# Patient Record
Sex: FEMALE | Race: WHITE | NOT HISPANIC OR LATINO | Employment: OTHER | URBAN - METROPOLITAN AREA
[De-identification: names, ages, dates, MRNs, and addresses within clinical notes are randomized per-mention and may not be internally consistent; named-entity substitution may affect disease eponyms.]

---

## 2017-01-28 ENCOUNTER — APPOINTMENT (OUTPATIENT)
Dept: GENERAL RADIOLOGY | Facility: HOSPITAL | Age: 82
End: 2017-01-28

## 2017-01-28 ENCOUNTER — HOSPITAL ENCOUNTER (INPATIENT)
Facility: HOSPITAL | Age: 82
LOS: 7 days | Discharge: NURSING, REHAB OR LONG-TERM CARE | End: 2017-02-04
Attending: EMERGENCY MEDICINE | Admitting: INTERNAL MEDICINE

## 2017-01-28 DIAGNOSIS — R26.2 DIFFICULTY WALKING: ICD-10-CM

## 2017-01-28 DIAGNOSIS — S72.142A INTERTROCHANTERIC FRACTURE OF LEFT FEMUR, CLOSED, INITIAL ENCOUNTER (HCC): Primary | ICD-10-CM

## 2017-01-28 DIAGNOSIS — K63.5 COLON POLYP: ICD-10-CM

## 2017-01-28 LAB
ALBUMIN SERPL-MCNC: 4 G/DL (ref 3.5–5.2)
ALBUMIN/GLOB SERPL: 1.6 G/DL
ALP SERPL-CCNC: 70 U/L (ref 39–117)
ALT SERPL W P-5'-P-CCNC: 21 U/L (ref 1–33)
ANION GAP SERPL CALCULATED.3IONS-SCNC: 10.8 MMOL/L
AST SERPL-CCNC: 35 U/L (ref 1–32)
BASOPHILS # BLD AUTO: 0.02 10*3/MM3 (ref 0–0.2)
BASOPHILS NFR BLD AUTO: 0.1 % (ref 0–1.5)
BILIRUB SERPL-MCNC: 0.4 MG/DL (ref 0.1–1.2)
BUN BLD-MCNC: 30 MG/DL (ref 8–23)
BUN/CREAT SERPL: 29.7 (ref 7–25)
CALCIUM SPEC-SCNC: 9.7 MG/DL (ref 8.2–9.6)
CHLORIDE SERPL-SCNC: 96 MMOL/L (ref 98–107)
CO2 SERPL-SCNC: 26.2 MMOL/L (ref 22–29)
CREAT BLD-MCNC: 1.01 MG/DL (ref 0.57–1)
DEPRECATED RDW RBC AUTO: 44.4 FL (ref 37–54)
EOSINOPHIL # BLD AUTO: 0.22 10*3/MM3 (ref 0–0.7)
EOSINOPHIL NFR BLD AUTO: 1.5 % (ref 0.3–6.2)
ERYTHROCYTE [DISTWIDTH] IN BLOOD BY AUTOMATED COUNT: 12.7 % (ref 11.7–13)
GFR SERPL CREATININE-BSD FRML MDRD: 51 ML/MIN/1.73
GLOBULIN UR ELPH-MCNC: 2.5 GM/DL
GLUCOSE BLD-MCNC: 103 MG/DL (ref 65–99)
HCT VFR BLD AUTO: 37.7 % (ref 35.6–45.5)
HGB BLD-MCNC: 12.5 G/DL (ref 11.9–15.5)
IMM GRANULOCYTES # BLD: 0.06 10*3/MM3 (ref 0–0.03)
IMM GRANULOCYTES NFR BLD: 0.4 % (ref 0–0.5)
INR PPP: 0.98 (ref 0.9–1.1)
LYMPHOCYTES # BLD AUTO: 5.33 10*3/MM3 (ref 0.9–4.8)
LYMPHOCYTES NFR BLD AUTO: 36.3 % (ref 19.6–45.3)
MCH RBC QN AUTO: 31.8 PG (ref 26.9–32)
MCHC RBC AUTO-ENTMCNC: 33.2 G/DL (ref 32.4–36.3)
MCV RBC AUTO: 95.9 FL (ref 80.5–98.2)
MONOCYTES # BLD AUTO: 0.55 10*3/MM3 (ref 0.2–1.2)
MONOCYTES NFR BLD AUTO: 3.7 % (ref 5–12)
NEUTROPHILS # BLD AUTO: 8.52 10*3/MM3 (ref 1.9–8.1)
NEUTROPHILS NFR BLD AUTO: 58 % (ref 42.7–76)
PLATELET # BLD AUTO: 181 10*3/MM3 (ref 140–500)
PMV BLD AUTO: 10.3 FL (ref 6–12)
POTASSIUM BLD-SCNC: 4.6 MMOL/L (ref 3.5–5.2)
PROT SERPL-MCNC: 6.5 G/DL (ref 6–8.5)
PROTHROMBIN TIME: 12.6 SECONDS (ref 11.7–14.2)
RBC # BLD AUTO: 3.93 10*6/MM3 (ref 3.9–5.2)
SODIUM BLD-SCNC: 133 MMOL/L (ref 136–145)
WBC NRBC COR # BLD: 14.7 10*3/MM3 (ref 4.5–10.7)

## 2017-01-28 PROCEDURE — 93005 ELECTROCARDIOGRAM TRACING: CPT | Performed by: INTERNAL MEDICINE

## 2017-01-28 PROCEDURE — 93010 ELECTROCARDIOGRAM REPORT: CPT | Performed by: INTERNAL MEDICINE

## 2017-01-28 PROCEDURE — 25010000002 HYDROMORPHONE PER 4 MG: Performed by: EMERGENCY MEDICINE

## 2017-01-28 PROCEDURE — 85610 PROTHROMBIN TIME: CPT | Performed by: EMERGENCY MEDICINE

## 2017-01-28 PROCEDURE — 85025 COMPLETE CBC W/AUTO DIFF WBC: CPT | Performed by: EMERGENCY MEDICINE

## 2017-01-28 PROCEDURE — 25010000002 HYDROMORPHONE PER 4 MG

## 2017-01-28 PROCEDURE — 80053 COMPREHEN METABOLIC PANEL: CPT | Performed by: EMERGENCY MEDICINE

## 2017-01-28 PROCEDURE — 71010 HC CHEST PA OR AP: CPT

## 2017-01-28 PROCEDURE — 73502 X-RAY EXAM HIP UNI 2-3 VIEWS: CPT

## 2017-01-28 PROCEDURE — 25010000002 ONDANSETRON PER 1 MG: Performed by: EMERGENCY MEDICINE

## 2017-01-28 PROCEDURE — 25010000002 LORAZEPAM PER 2 MG: Performed by: INTERNAL MEDICINE

## 2017-01-28 PROCEDURE — 81001 URINALYSIS AUTO W/SCOPE: CPT | Performed by: INTERNAL MEDICINE

## 2017-01-28 PROCEDURE — 25010000002 HYDROMORPHONE PER 4 MG: Performed by: INTERNAL MEDICINE

## 2017-01-28 PROCEDURE — 99284 EMERGENCY DEPT VISIT MOD MDM: CPT

## 2017-01-28 PROCEDURE — 87086 URINE CULTURE/COLONY COUNT: CPT | Performed by: INTERNAL MEDICINE

## 2017-01-28 RX ORDER — CHOLECALCIFEROL (VITAMIN D3) 125 MCG
5 CAPSULE ORAL NIGHTLY
Status: DISCONTINUED | OUTPATIENT
Start: 2017-01-28 | End: 2017-02-01

## 2017-01-28 RX ORDER — ESTRADIOL 0.1 MG/G
2 CREAM VAGINAL 3 TIMES WEEKLY
Status: DISCONTINUED | OUTPATIENT
Start: 2017-01-30 | End: 2017-02-04 | Stop reason: HOSPADM

## 2017-01-28 RX ORDER — DEXTROSE, SODIUM CHLORIDE, AND POTASSIUM CHLORIDE 5; .45; .15 G/100ML; G/100ML; G/100ML
125 INJECTION INTRAVENOUS CONTINUOUS
Status: DISCONTINUED | OUTPATIENT
Start: 2017-01-28 | End: 2017-01-29

## 2017-01-28 RX ORDER — MORPHINE SULFATE 2 MG/ML
1 INJECTION, SOLUTION INTRAMUSCULAR; INTRAVENOUS EVERY 4 HOURS PRN
Status: DISCONTINUED | OUTPATIENT
Start: 2017-01-28 | End: 2017-02-04 | Stop reason: HOSPADM

## 2017-01-28 RX ORDER — NALOXONE HCL 0.4 MG/ML
0.4 VIAL (ML) INJECTION
Status: DISCONTINUED | OUTPATIENT
Start: 2017-01-28 | End: 2017-02-04 | Stop reason: HOSPADM

## 2017-01-28 RX ORDER — POLYETHYLENE GLYCOL 3350 17 G/17G
17 POWDER, FOR SOLUTION ORAL DAILY
Status: DISCONTINUED | OUTPATIENT
Start: 2017-01-29 | End: 2017-02-04 | Stop reason: HOSPADM

## 2017-01-28 RX ORDER — METOPROLOL TARTRATE 50 MG/1
50 TABLET, FILM COATED ORAL EVERY MORNING
Status: DISCONTINUED | OUTPATIENT
Start: 2017-01-29 | End: 2017-02-04 | Stop reason: HOSPADM

## 2017-01-28 RX ORDER — L.ACID,PARA/B.BIFIDUM/S.THERM 8B CELL
1 CAPSULE ORAL DAILY
Status: DISCONTINUED | OUTPATIENT
Start: 2017-01-29 | End: 2017-02-04 | Stop reason: HOSPADM

## 2017-01-28 RX ORDER — ONDANSETRON 2 MG/ML
4 INJECTION INTRAMUSCULAR; INTRAVENOUS ONCE
Status: COMPLETED | OUTPATIENT
Start: 2017-01-28 | End: 2017-01-28

## 2017-01-28 RX ORDER — SODIUM CHLORIDE 9 MG/ML
75 INJECTION, SOLUTION INTRAVENOUS CONTINUOUS
Status: DISCONTINUED | OUTPATIENT
Start: 2017-01-28 | End: 2017-01-28

## 2017-01-28 RX ORDER — HYDROMORPHONE HYDROCHLORIDE 1 MG/ML
0.5 INJECTION, SOLUTION INTRAMUSCULAR; INTRAVENOUS; SUBCUTANEOUS ONCE
Status: COMPLETED | OUTPATIENT
Start: 2017-01-28 | End: 2017-01-28

## 2017-01-28 RX ORDER — MELATONIN
1000 DAILY
Status: DISCONTINUED | OUTPATIENT
Start: 2017-01-29 | End: 2017-02-04 | Stop reason: HOSPADM

## 2017-01-28 RX ORDER — SODIUM CHLORIDE 0.9 % (FLUSH) 0.9 %
10 SYRINGE (ML) INJECTION AS NEEDED
Status: DISCONTINUED | OUTPATIENT
Start: 2017-01-28 | End: 2017-01-28

## 2017-01-28 RX ORDER — LORAZEPAM 2 MG/ML
0.5 INJECTION INTRAMUSCULAR EVERY 6 HOURS PRN
Status: DISCONTINUED | OUTPATIENT
Start: 2017-01-28 | End: 2017-01-31

## 2017-01-28 RX ORDER — RIVASTIGMINE 4.6 MG/24H
1 PATCH, EXTENDED RELEASE TRANSDERMAL DAILY
Status: DISCONTINUED | OUTPATIENT
Start: 2017-01-29 | End: 2017-02-04 | Stop reason: HOSPADM

## 2017-01-28 RX ORDER — SODIUM CHLORIDE 0.9 % (FLUSH) 0.9 %
1-10 SYRINGE (ML) INJECTION AS NEEDED
Status: DISCONTINUED | OUTPATIENT
Start: 2017-01-28 | End: 2017-02-04 | Stop reason: HOSPADM

## 2017-01-28 RX ORDER — PANTOPRAZOLE SODIUM 40 MG/10ML
40 INJECTION, POWDER, LYOPHILIZED, FOR SOLUTION INTRAVENOUS
Status: DISCONTINUED | OUTPATIENT
Start: 2017-01-29 | End: 2017-02-04 | Stop reason: HOSPADM

## 2017-01-28 RX ORDER — CETIRIZINE HYDROCHLORIDE 10 MG/1
5 TABLET ORAL DAILY
Status: DISCONTINUED | OUTPATIENT
Start: 2017-01-29 | End: 2017-02-01

## 2017-01-28 RX ADMIN — Medication 5 MG: at 23:52

## 2017-01-28 RX ADMIN — HYDROMORPHONE HYDROCHLORIDE 1 MG: 1 INJECTION, SOLUTION INTRAMUSCULAR; INTRAVENOUS; SUBCUTANEOUS at 21:09

## 2017-01-28 RX ADMIN — HYDROMORPHONE HYDROCHLORIDE 0.5 MG: 1 INJECTION, SOLUTION INTRAMUSCULAR; INTRAVENOUS; SUBCUTANEOUS at 18:07

## 2017-01-28 RX ADMIN — HYDROMORPHONE HYDROCHLORIDE 0.5 MG: 1 INJECTION, SOLUTION INTRAMUSCULAR; INTRAVENOUS; SUBCUTANEOUS at 18:32

## 2017-01-28 RX ADMIN — LORAZEPAM 0.5 MG: 2 INJECTION INTRAMUSCULAR; INTRAVENOUS at 22:09

## 2017-01-28 RX ADMIN — POTASSIUM CHLORIDE, DEXTROSE MONOHYDRATE AND SODIUM CHLORIDE 125 ML/HR: 150; 5; 450 INJECTION, SOLUTION INTRAVENOUS at 21:10

## 2017-01-28 RX ADMIN — ONDANSETRON 4 MG: 2 INJECTION INTRAMUSCULAR; INTRAVENOUS at 18:07

## 2017-01-29 ENCOUNTER — ANESTHESIA EVENT (OUTPATIENT)
Dept: PERIOP | Facility: HOSPITAL | Age: 82
End: 2017-01-29

## 2017-01-29 ENCOUNTER — APPOINTMENT (OUTPATIENT)
Dept: GENERAL RADIOLOGY | Facility: HOSPITAL | Age: 82
End: 2017-01-29

## 2017-01-29 ENCOUNTER — ANESTHESIA (OUTPATIENT)
Dept: PERIOP | Facility: HOSPITAL | Age: 82
End: 2017-01-29

## 2017-01-29 LAB
ANION GAP SERPL CALCULATED.3IONS-SCNC: 11.7 MMOL/L
BACTERIA UR QL AUTO: ABNORMAL /HPF
BASOPHILS # BLD AUTO: 0.01 10*3/MM3 (ref 0–0.2)
BASOPHILS NFR BLD AUTO: 0.1 % (ref 0–1.5)
BILIRUB UR QL STRIP: NEGATIVE
BUN BLD-MCNC: 27 MG/DL (ref 8–23)
BUN/CREAT SERPL: 26.2 (ref 7–25)
CALCIUM SPEC-SCNC: 8.9 MG/DL (ref 8.2–9.6)
CHLORIDE SERPL-SCNC: 97 MMOL/L (ref 98–107)
CLARITY UR: CLEAR
CO2 SERPL-SCNC: 25.3 MMOL/L (ref 22–29)
COLOR UR: YELLOW
CREAT BLD-MCNC: 1.03 MG/DL (ref 0.57–1)
DEPRECATED RDW RBC AUTO: 47.2 FL (ref 37–54)
EOSINOPHIL # BLD AUTO: 0 10*3/MM3 (ref 0–0.7)
EOSINOPHIL NFR BLD AUTO: 0 % (ref 0.3–6.2)
ERYTHROCYTE [DISTWIDTH] IN BLOOD BY AUTOMATED COUNT: 13.1 % (ref 11.7–13)
GFR SERPL CREATININE-BSD FRML MDRD: 50 ML/MIN/1.73
GLUCOSE BLD-MCNC: 181 MG/DL (ref 65–99)
GLUCOSE UR STRIP-MCNC: NEGATIVE MG/DL
HCT VFR BLD AUTO: 33.4 % (ref 35.6–45.5)
HGB BLD-MCNC: 10.8 G/DL (ref 11.9–15.5)
HGB UR QL STRIP.AUTO: ABNORMAL
HYALINE CASTS UR QL AUTO: ABNORMAL /LPF
IMM GRANULOCYTES # BLD: 0.02 10*3/MM3 (ref 0–0.03)
IMM GRANULOCYTES NFR BLD: 0.2 % (ref 0–0.5)
KETONES UR QL STRIP: NEGATIVE
LEUKOCYTE ESTERASE UR QL STRIP.AUTO: NEGATIVE
LYMPHOCYTES # BLD AUTO: 3.45 10*3/MM3 (ref 0.9–4.8)
LYMPHOCYTES NFR BLD AUTO: 31.2 % (ref 19.6–45.3)
MAGNESIUM SERPL-MCNC: 1.7 MG/DL (ref 1.7–2.3)
MCH RBC QN AUTO: 32 PG (ref 26.9–32)
MCHC RBC AUTO-ENTMCNC: 32.3 G/DL (ref 32.4–36.3)
MCV RBC AUTO: 98.8 FL (ref 80.5–98.2)
MONOCYTES # BLD AUTO: 0.56 10*3/MM3 (ref 0.2–1.2)
MONOCYTES NFR BLD AUTO: 5.1 % (ref 5–12)
NEUTROPHILS # BLD AUTO: 7.01 10*3/MM3 (ref 1.9–8.1)
NEUTROPHILS NFR BLD AUTO: 63.4 % (ref 42.7–76)
NITRITE UR QL STRIP: NEGATIVE
PH UR STRIP.AUTO: 6 [PH] (ref 5–8)
PLATELET # BLD AUTO: 162 10*3/MM3 (ref 140–500)
PMV BLD AUTO: 10.4 FL (ref 6–12)
POTASSIUM BLD-SCNC: 5.3 MMOL/L (ref 3.5–5.2)
PROT UR QL STRIP: NEGATIVE
RBC # BLD AUTO: 3.38 10*6/MM3 (ref 3.9–5.2)
RBC # UR: ABNORMAL /HPF
REF LAB TEST METHOD: ABNORMAL
SODIUM BLD-SCNC: 134 MMOL/L (ref 136–145)
SP GR UR STRIP: 1.02 (ref 1–1.03)
SQUAMOUS #/AREA URNS HPF: ABNORMAL /HPF
TSH SERPL DL<=0.05 MIU/L-ACNC: 1.18 MIU/ML (ref 0.27–4.2)
UROBILINOGEN UR QL STRIP: ABNORMAL
WBC NRBC COR # BLD: 11.05 10*3/MM3 (ref 4.5–10.7)
WBC UR QL AUTO: ABNORMAL /HPF

## 2017-01-29 PROCEDURE — 25010000002 HYDROMORPHONE PER 4 MG: Performed by: INTERNAL MEDICINE

## 2017-01-29 PROCEDURE — 84443 ASSAY THYROID STIM HORMONE: CPT | Performed by: INTERNAL MEDICINE

## 2017-01-29 PROCEDURE — 25010000003 CEFAZOLIN PER 500 MG: Performed by: ORTHOPAEDIC SURGERY

## 2017-01-29 PROCEDURE — 25010000002 FENTANYL CITRATE (PF) 100 MCG/2ML SOLUTION: Performed by: ANESTHESIOLOGY

## 2017-01-29 PROCEDURE — 25010000002 FENTANYL CITRATE (PF) 100 MCG/2ML SOLUTION

## 2017-01-29 PROCEDURE — 0QSC06Z REPOSITION LEFT LOWER FEMUR WITH INTRAMEDULLARY INTERNAL FIXATION DEVICE, OPEN APPROACH: ICD-10-PCS | Performed by: ORTHOPAEDIC SURGERY

## 2017-01-29 PROCEDURE — 25010000002 NEOSTIGMINE 10 MG/10ML SOLUTION: Performed by: ANESTHESIOLOGY

## 2017-01-29 PROCEDURE — 25010000002 ROPIVACAINE PER 1 MG

## 2017-01-29 PROCEDURE — 83735 ASSAY OF MAGNESIUM: CPT | Performed by: INTERNAL MEDICINE

## 2017-01-29 PROCEDURE — C1713 ANCHOR/SCREW BN/BN,TIS/BN: HCPCS | Performed by: ORTHOPAEDIC SURGERY

## 2017-01-29 PROCEDURE — 94799 UNLISTED PULMONARY SVC/PX: CPT

## 2017-01-29 PROCEDURE — 80048 BASIC METABOLIC PNL TOTAL CA: CPT | Performed by: INTERNAL MEDICINE

## 2017-01-29 PROCEDURE — 85025 COMPLETE CBC W/AUTO DIFF WBC: CPT | Performed by: INTERNAL MEDICINE

## 2017-01-29 PROCEDURE — 25010000002 MIDAZOLAM PER 1 MG: Performed by: ANESTHESIOLOGY

## 2017-01-29 PROCEDURE — 73502 X-RAY EXAM HIP UNI 2-3 VIEWS: CPT

## 2017-01-29 PROCEDURE — 76000 FLUOROSCOPY <1 HR PHYS/QHP: CPT

## 2017-01-29 PROCEDURE — 25010000002 HYDROMORPHONE PER 4 MG: Performed by: ANESTHESIOLOGY

## 2017-01-29 PROCEDURE — 73501 X-RAY EXAM HIP UNI 1 VIEW: CPT

## 2017-01-29 PROCEDURE — 25010000002 PROPOFOL 10 MG/ML EMULSION: Performed by: ANESTHESIOLOGY

## 2017-01-29 PROCEDURE — 25010000002 ONDANSETRON PER 1 MG: Performed by: ANESTHESIOLOGY

## 2017-01-29 PROCEDURE — 25010000002 PHENYLEPHRINE PER 1 ML: Performed by: ANESTHESIOLOGY

## 2017-01-29 DEVICE — TROCHANTERIC NAIL KIT, TI
Type: IMPLANTABLE DEVICE | Status: FUNCTIONAL
Brand: GAMMA

## 2017-01-29 DEVICE — LOCKING SCREW, FULLY THREADED: Type: IMPLANTABLE DEVICE | Status: FUNCTIONAL

## 2017-01-29 DEVICE — LAG SCREW, TI
Type: IMPLANTABLE DEVICE | Status: FUNCTIONAL
Brand: GAMMA

## 2017-01-29 RX ORDER — ONDANSETRON 2 MG/ML
INJECTION INTRAMUSCULAR; INTRAVENOUS AS NEEDED
Status: DISCONTINUED | OUTPATIENT
Start: 2017-01-29 | End: 2017-01-29 | Stop reason: SURG

## 2017-01-29 RX ORDER — PROMETHAZINE HYDROCHLORIDE 25 MG/ML
12.5 INJECTION, SOLUTION INTRAMUSCULAR; INTRAVENOUS ONCE AS NEEDED
Status: DISCONTINUED | OUTPATIENT
Start: 2017-01-29 | End: 2017-01-29 | Stop reason: HOSPADM

## 2017-01-29 RX ORDER — LIDOCAINE HYDROCHLORIDE 20 MG/ML
INJECTION, SOLUTION INFILTRATION; PERINEURAL AS NEEDED
Status: DISCONTINUED | OUTPATIENT
Start: 2017-01-29 | End: 2017-01-29 | Stop reason: SURG

## 2017-01-29 RX ORDER — ROPIVACAINE HYDROCHLORIDE 5 MG/ML
INJECTION, SOLUTION EPIDURAL; INFILTRATION; PERINEURAL
Status: COMPLETED
Start: 2017-01-29 | End: 2017-01-29

## 2017-01-29 RX ORDER — GLYCOPYRROLATE 0.2 MG/ML
INJECTION INTRAMUSCULAR; INTRAVENOUS AS NEEDED
Status: DISCONTINUED | OUTPATIENT
Start: 2017-01-29 | End: 2017-01-29 | Stop reason: SURG

## 2017-01-29 RX ORDER — ROCURONIUM BROMIDE 10 MG/ML
INJECTION, SOLUTION INTRAVENOUS AS NEEDED
Status: DISCONTINUED | OUTPATIENT
Start: 2017-01-29 | End: 2017-01-29 | Stop reason: SURG

## 2017-01-29 RX ORDER — SODIUM CHLORIDE 9 MG/ML
75 INJECTION, SOLUTION INTRAVENOUS CONTINUOUS
Status: DISCONTINUED | OUTPATIENT
Start: 2017-01-29 | End: 2017-01-31

## 2017-01-29 RX ORDER — FAMOTIDINE 10 MG/ML
20 INJECTION, SOLUTION INTRAVENOUS ONCE
Status: COMPLETED | OUTPATIENT
Start: 2017-01-29 | End: 2017-01-29

## 2017-01-29 RX ORDER — DOCUSATE SODIUM 100 MG/1
100 CAPSULE, LIQUID FILLED ORAL 2 TIMES DAILY PRN
Status: DISCONTINUED | OUTPATIENT
Start: 2017-01-29 | End: 2017-02-04 | Stop reason: HOSPADM

## 2017-01-29 RX ORDER — PROPOFOL 10 MG/ML
VIAL (ML) INTRAVENOUS AS NEEDED
Status: DISCONTINUED | OUTPATIENT
Start: 2017-01-29 | End: 2017-01-29 | Stop reason: SURG

## 2017-01-29 RX ORDER — FENTANYL CITRATE 50 UG/ML
50 INJECTION, SOLUTION INTRAMUSCULAR; INTRAVENOUS
Status: DISCONTINUED | OUTPATIENT
Start: 2017-01-29 | End: 2017-01-29 | Stop reason: HOSPADM

## 2017-01-29 RX ORDER — FENTANYL CITRATE 50 UG/ML
INJECTION, SOLUTION INTRAMUSCULAR; INTRAVENOUS
Status: COMPLETED
Start: 2017-01-29 | End: 2017-01-29

## 2017-01-29 RX ORDER — MIDAZOLAM HYDROCHLORIDE 1 MG/ML
2 INJECTION INTRAMUSCULAR; INTRAVENOUS
Status: DISCONTINUED | OUTPATIENT
Start: 2017-01-29 | End: 2017-01-29 | Stop reason: HOSPADM

## 2017-01-29 RX ORDER — SODIUM CHLORIDE, SODIUM LACTATE, POTASSIUM CHLORIDE, CALCIUM CHLORIDE 600; 310; 30; 20 MG/100ML; MG/100ML; MG/100ML; MG/100ML
9 INJECTION, SOLUTION INTRAVENOUS CONTINUOUS
Status: DISCONTINUED | OUTPATIENT
Start: 2017-01-29 | End: 2017-01-29

## 2017-01-29 RX ORDER — HYDROCODONE BITARTRATE AND ACETAMINOPHEN 7.5; 325 MG/1; MG/1
1 TABLET ORAL EVERY 4 HOURS PRN
Status: DISCONTINUED | OUTPATIENT
Start: 2017-01-29 | End: 2017-02-04 | Stop reason: HOSPADM

## 2017-01-29 RX ORDER — SODIUM CHLORIDE 0.9 % (FLUSH) 0.9 %
1-10 SYRINGE (ML) INJECTION AS NEEDED
Status: DISCONTINUED | OUTPATIENT
Start: 2017-01-29 | End: 2017-01-29 | Stop reason: HOSPADM

## 2017-01-29 RX ORDER — NEOSTIGMINE METHYLSULFATE 1 MG/ML
INJECTION, SOLUTION INTRAVENOUS AS NEEDED
Status: DISCONTINUED | OUTPATIENT
Start: 2017-01-29 | End: 2017-01-29 | Stop reason: SURG

## 2017-01-29 RX ORDER — PROMETHAZINE HYDROCHLORIDE 25 MG/1
25 SUPPOSITORY RECTAL ONCE AS NEEDED
Status: DISCONTINUED | OUTPATIENT
Start: 2017-01-29 | End: 2017-01-29 | Stop reason: HOSPADM

## 2017-01-29 RX ORDER — BISACODYL 5 MG/1
10 TABLET, DELAYED RELEASE ORAL DAILY PRN
Status: DISCONTINUED | OUTPATIENT
Start: 2017-01-29 | End: 2017-02-04 | Stop reason: HOSPADM

## 2017-01-29 RX ORDER — PROMETHAZINE HYDROCHLORIDE 25 MG/1
25 TABLET ORAL ONCE AS NEEDED
Status: DISCONTINUED | OUTPATIENT
Start: 2017-01-29 | End: 2017-01-29 | Stop reason: HOSPADM

## 2017-01-29 RX ORDER — HYDROMORPHONE HCL 110MG/55ML
PATIENT CONTROLLED ANALGESIA SYRINGE INTRAVENOUS
Status: DISCONTINUED
Start: 2017-01-29 | End: 2017-01-29 | Stop reason: WASHOUT

## 2017-01-29 RX ORDER — MIDAZOLAM HYDROCHLORIDE 1 MG/ML
1 INJECTION INTRAMUSCULAR; INTRAVENOUS
Status: DISCONTINUED | OUTPATIENT
Start: 2017-01-29 | End: 2017-01-29 | Stop reason: HOSPADM

## 2017-01-29 RX ORDER — HYDROMORPHONE HYDROCHLORIDE 1 MG/ML
0.5 INJECTION, SOLUTION INTRAMUSCULAR; INTRAVENOUS; SUBCUTANEOUS
Status: DISCONTINUED | OUTPATIENT
Start: 2017-01-29 | End: 2017-01-29 | Stop reason: HOSPADM

## 2017-01-29 RX ADMIN — SODIUM CHLORIDE, POTASSIUM CHLORIDE, SODIUM LACTATE AND CALCIUM CHLORIDE: 600; 310; 30; 20 INJECTION, SOLUTION INTRAVENOUS at 11:20

## 2017-01-29 RX ADMIN — GLYCOPYRROLATE 0.4 MG: 0.2 INJECTION INTRAMUSCULAR; INTRAVENOUS at 11:10

## 2017-01-29 RX ADMIN — CEFAZOLIN SODIUM 1 G: 1 INJECTION, SOLUTION INTRAVENOUS at 09:51

## 2017-01-29 RX ADMIN — HYDROCODONE BITARTRATE AND ACETAMINOPHEN 1 TABLET: 7.5; 325 TABLET ORAL at 21:49

## 2017-01-29 RX ADMIN — LIDOCAINE HYDROCHLORIDE 60 MG: 20 INJECTION, SOLUTION INFILTRATION; PERINEURAL at 10:04

## 2017-01-29 RX ADMIN — PANTOPRAZOLE SODIUM 40 MG: 40 INJECTION, POWDER, FOR SOLUTION INTRAVENOUS at 05:07

## 2017-01-29 RX ADMIN — FENTANYL CITRATE 50 MCG: 50 INJECTION, SOLUTION INTRAMUSCULAR; INTRAVENOUS at 11:20

## 2017-01-29 RX ADMIN — SODIUM CHLORIDE 75 ML/HR: 9 INJECTION, SOLUTION INTRAVENOUS at 22:32

## 2017-01-29 RX ADMIN — ONDANSETRON 4 MG: 2 INJECTION INTRAMUSCULAR; INTRAVENOUS at 11:24

## 2017-01-29 RX ADMIN — PROPOFOL 90 MG: 10 INJECTION, EMULSION INTRAVENOUS at 10:04

## 2017-01-29 RX ADMIN — PROPOFOL 30 MG: 10 INJECTION, EMULSION INTRAVENOUS at 10:06

## 2017-01-29 RX ADMIN — FENTANYL CITRATE 50 MCG: 50 INJECTION, SOLUTION INTRAMUSCULAR; INTRAVENOUS at 10:57

## 2017-01-29 RX ADMIN — HYDROCODONE BITARTRATE AND ACETAMINOPHEN 1 TABLET: 7.5; 325 TABLET ORAL at 16:11

## 2017-01-29 RX ADMIN — FENTANYL CITRATE 50 MCG: 50 INJECTION INTRAMUSCULAR; INTRAVENOUS at 11:59

## 2017-01-29 RX ADMIN — MIDAZOLAM 0.5 MG: 1 INJECTION INTRAMUSCULAR; INTRAVENOUS at 09:30

## 2017-01-29 RX ADMIN — METOPROLOL TARTRATE 50 MG: 50 TABLET ORAL at 08:30

## 2017-01-29 RX ADMIN — FAMOTIDINE 20 MG: 10 INJECTION, SOLUTION INTRAVENOUS at 09:30

## 2017-01-29 RX ADMIN — EPHEDRINE SULFATE 10 MG: 50 INJECTION INTRAMUSCULAR; INTRAVENOUS; SUBCUTANEOUS at 10:07

## 2017-01-29 RX ADMIN — Medication 5 MG: at 20:25

## 2017-01-29 RX ADMIN — EPHEDRINE SULFATE 10 MG: 50 INJECTION INTRAMUSCULAR; INTRAVENOUS; SUBCUTANEOUS at 10:12

## 2017-01-29 RX ADMIN — PHENYLEPHRINE HYDROCHLORIDE 200 MCG: 10 INJECTION INTRAVENOUS at 11:15

## 2017-01-29 RX ADMIN — HYDROMORPHONE HYDROCHLORIDE 1 MG: 1 INJECTION, SOLUTION INTRAMUSCULAR; INTRAVENOUS; SUBCUTANEOUS at 22:38

## 2017-01-29 RX ADMIN — ROPIVACAINE HYDROCHLORIDE 25 ML: 5 INJECTION, SOLUTION EPIDURAL; INFILTRATION; PERINEURAL at 10:25

## 2017-01-29 RX ADMIN — NEOSTIGMINE METHYLSULFATE 3 MG: 1 INJECTION INTRAVENOUS at 11:14

## 2017-01-29 RX ADMIN — FENTANYL CITRATE 100 MCG: 50 INJECTION, SOLUTION INTRAMUSCULAR; INTRAVENOUS at 10:02

## 2017-01-29 RX ADMIN — HYDROMORPHONE HYDROCHLORIDE 1 MG: 1 INJECTION, SOLUTION INTRAMUSCULAR; INTRAVENOUS; SUBCUTANEOUS at 02:22

## 2017-01-29 RX ADMIN — PHENYLEPHRINE HYDROCHLORIDE 100 MCG: 10 INJECTION INTRAVENOUS at 10:18

## 2017-01-29 RX ADMIN — CEFAZOLIN SODIUM 1 G: 1 INJECTION, SOLUTION INTRAVENOUS at 18:39

## 2017-01-29 RX ADMIN — POTASSIUM CHLORIDE, DEXTROSE MONOHYDRATE AND SODIUM CHLORIDE 125 ML/HR: 150; 5; 450 INJECTION, SOLUTION INTRAVENOUS at 04:52

## 2017-01-29 RX ADMIN — EPHEDRINE SULFATE 10 MG: 50 INJECTION INTRAMUSCULAR; INTRAVENOUS; SUBCUTANEOUS at 11:04

## 2017-01-29 RX ADMIN — SODIUM CHLORIDE, POTASSIUM CHLORIDE, SODIUM LACTATE AND CALCIUM CHLORIDE: 600; 310; 30; 20 INJECTION, SOLUTION INTRAVENOUS at 09:51

## 2017-01-29 RX ADMIN — PHENYLEPHRINE HYDROCHLORIDE 100 MCG: 10 INJECTION INTRAVENOUS at 10:26

## 2017-01-29 RX ADMIN — HYDROMORPHONE HYDROCHLORIDE 0.5 MG: 1 INJECTION, SOLUTION INTRAMUSCULAR; INTRAVENOUS; SUBCUTANEOUS at 12:05

## 2017-01-29 RX ADMIN — ROCURONIUM BROMIDE 30 MG: 10 INJECTION INTRAVENOUS at 10:04

## 2017-01-29 RX ADMIN — FENTANYL CITRATE 25 MCG: 50 INJECTION INTRAMUSCULAR; INTRAVENOUS at 09:31

## 2017-01-29 NOTE — ANESTHESIA PROCEDURE NOTES
Peripheral Block    Patient location during procedure: OR  Stop time: 1/29/2017 10:25 AM  Reason for block: at surgeon's request and post-op pain management  Performed by  Anesthesiologist: JASSI OLIVAS  Preanesthetic Checklist  Completed: patient identified, site marked, surgical consent, pre-op evaluation, timeout performed, IV checked, risks and benefits discussed and monitors and equipment checked  Peripheral Block Prep:  Sterile barriers:cap and mask  Prep: ChloraPrep  Patient monitoring: blood pressure monitoring, continuous pulse oximetry and EKG  Peripheral Procedure  Nursing cardiac assessment comments yes: under GA.  Guidance:landmark technique  Images:still images not obtained  Laterality:leftBlock Type:adductor canal block  Needle Type:Tuohy  Needle Gauge:18 G    Medications  Local anesthetic: ropiv 0.25% Local Amount Injected:45mL

## 2017-01-29 NOTE — ANESTHESIA PROCEDURE NOTES
Airway  Urgency: elective    Airway not difficult    General Information and Staff    Patient location during procedure: OR  Anesthesiologist: JASSI OLIVAS    Indications and Patient Condition  Indications for airway management: airway protection    Preoxygenated: yes  Mask difficulty assessment: 1 - vent by mask    Final Airway Details  Final airway type: endotracheal airway      Successful airway: ETT    Successful intubation technique: direct laryngoscopy  Facilitating devices/methods: cricoid pressure  Endotracheal tube insertion site: oral  Blade: [place]  Blade size: #3  ETT size: 7.0 mm  Cormack-Lehane Classification: grade IIa - partial view of glottis  Placement verified by: chest auscultation   Measured from: lips  Number of attempts at approach: 1

## 2017-01-29 NOTE — ANESTHESIA POSTPROCEDURE EVALUATION
Patient: Emy Jameson    Procedure Summary     Date Anesthesia Start Anesthesia Stop Room / Location    01/29/17 0951 1137  [facility] /  [facility]       Procedure Diagnosis Surgeon Provider    FEMUR INTRAMEDULLARY NAILING/RODDING GAMMA NAIL PAULINA GUADALUPEA TABLE NABILA (Left Thigh) Closed comminuted intertrochanteric fracture of left femur MD Rosario Rooney MD          Anesthesia Type: general  Last vitals  /54 (01/29/17 1220)    Temp 36.4 °C (97.6 °F) (01/29/17 1135)    Pulse 92 (01/29/17 1220)   Resp 14 (01/29/17 1220)    SpO2 100 % (01/29/17 1220)      Post Anesthesia Care and Evaluation    Patient location during evaluation: PACU  Patient participation: complete - patient participated  Level of consciousness: sleepy but conscious  Pain score: 0  Pain management: adequate  Airway patency: patent  Anesthetic complications: No anesthetic complications    Cardiovascular status: acceptable  Respiratory status: acceptable  Hydration status: acceptable

## 2017-01-29 NOTE — ANESTHESIA PREPROCEDURE EVALUATION
Anesthesia Evaluation     Patient summary reviewed and Nursing notes reviewed    Airway   Mallampati: II  Neck ROM: full  possible difficult intubation  Dental - normal exam     Pulmonary     breath sounds clear to auscultation  Cardiovascular   (+) hypertension,     Rhythm: regular    Neuro/Psych  (+) psychiatric history Depression,    GI/Hepatic/Renal/Endo    (+)  chronic renal disease,     Musculoskeletal     Abdominal    Substance History      OB/GYN          Other   (+) blood dyscrasia, arthritis                          Anesthesia Plan    ASA 3     general     intravenous induction   Anesthetic plan and risks discussed with patient.

## 2017-01-30 LAB
ANION GAP SERPL CALCULATED.3IONS-SCNC: 13.2 MMOL/L
BACTERIA SPEC AEROBE CULT: NO GROWTH
BASOPHILS # BLD AUTO: 0.01 10*3/MM3 (ref 0–0.2)
BASOPHILS NFR BLD AUTO: 0.1 % (ref 0–1.5)
BUN BLD-MCNC: 26 MG/DL (ref 8–23)
BUN/CREAT SERPL: 15 (ref 7–25)
CALCIUM SPEC-SCNC: 8.6 MG/DL (ref 8.2–9.6)
CHLORIDE SERPL-SCNC: 94 MMOL/L (ref 98–107)
CO2 SERPL-SCNC: 22.8 MMOL/L (ref 22–29)
CREAT BLD-MCNC: 1.73 MG/DL (ref 0.57–1)
DEPRECATED RDW RBC AUTO: 45.6 FL (ref 37–54)
EOSINOPHIL # BLD AUTO: 0.02 10*3/MM3 (ref 0–0.7)
EOSINOPHIL NFR BLD AUTO: 0.2 % (ref 0.3–6.2)
ERYTHROCYTE [DISTWIDTH] IN BLOOD BY AUTOMATED COUNT: 13 % (ref 11.7–13)
GFR SERPL CREATININE-BSD FRML MDRD: 28 ML/MIN/1.73
GLUCOSE BLD-MCNC: 122 MG/DL (ref 65–99)
HCT VFR BLD AUTO: 26.7 % (ref 35.6–45.5)
HGB BLD-MCNC: 8.7 G/DL (ref 11.9–15.5)
IMM GRANULOCYTES # BLD: 0.04 10*3/MM3 (ref 0–0.03)
IMM GRANULOCYTES NFR BLD: 0.4 % (ref 0–0.5)
LYMPHOCYTES # BLD AUTO: 3.23 10*3/MM3 (ref 0.9–4.8)
LYMPHOCYTES NFR BLD AUTO: 28.5 % (ref 19.6–45.3)
MCH RBC QN AUTO: 31.5 PG (ref 26.9–32)
MCHC RBC AUTO-ENTMCNC: 32.6 G/DL (ref 32.4–36.3)
MCV RBC AUTO: 96.7 FL (ref 80.5–98.2)
MONOCYTES # BLD AUTO: 0.94 10*3/MM3 (ref 0.2–1.2)
MONOCYTES NFR BLD AUTO: 8.3 % (ref 5–12)
NEUTROPHILS # BLD AUTO: 7.1 10*3/MM3 (ref 1.9–8.1)
NEUTROPHILS NFR BLD AUTO: 62.5 % (ref 42.7–76)
PLATELET # BLD AUTO: 143 10*3/MM3 (ref 140–500)
PMV BLD AUTO: 10.4 FL (ref 6–12)
POTASSIUM BLD-SCNC: 5.3 MMOL/L (ref 3.5–5.2)
RBC # BLD AUTO: 2.76 10*6/MM3 (ref 3.9–5.2)
SODIUM BLD-SCNC: 130 MMOL/L (ref 136–145)
WBC NRBC COR # BLD: 11.34 10*3/MM3 (ref 4.5–10.7)

## 2017-01-30 PROCEDURE — 25010000002 ENOXAPARIN PER 10 MG: Performed by: ORTHOPAEDIC SURGERY

## 2017-01-30 PROCEDURE — 85025 COMPLETE CBC W/AUTO DIFF WBC: CPT | Performed by: INTERNAL MEDICINE

## 2017-01-30 PROCEDURE — 80048 BASIC METABOLIC PNL TOTAL CA: CPT | Performed by: INTERNAL MEDICINE

## 2017-01-30 PROCEDURE — 25010000002 HYDROMORPHONE PER 4 MG: Performed by: INTERNAL MEDICINE

## 2017-01-30 PROCEDURE — 97162 PT EVAL MOD COMPLEX 30 MIN: CPT

## 2017-01-30 PROCEDURE — 25010000003 CEFAZOLIN PER 500 MG: Performed by: ORTHOPAEDIC SURGERY

## 2017-01-30 PROCEDURE — 97110 THERAPEUTIC EXERCISES: CPT

## 2017-01-30 PROCEDURE — 25010000002 MORPHINE PER 10 MG: Performed by: INTERNAL MEDICINE

## 2017-01-30 PROCEDURE — 94799 UNLISTED PULMONARY SVC/PX: CPT

## 2017-01-30 RX ORDER — DEXTROSE AND SODIUM CHLORIDE 5; .45 G/100ML; G/100ML
125 INJECTION, SOLUTION INTRAVENOUS CONTINUOUS
Status: DISCONTINUED | OUTPATIENT
Start: 2017-01-30 | End: 2017-01-31

## 2017-01-30 RX ADMIN — HYDROCODONE BITARTRATE AND ACETAMINOPHEN 1 TABLET: 7.5; 325 TABLET ORAL at 20:16

## 2017-01-30 RX ADMIN — DEXTROSE AND SODIUM CHLORIDE 100 ML/HR: 5; 450 INJECTION, SOLUTION INTRAVENOUS at 20:16

## 2017-01-30 RX ADMIN — HYDROMORPHONE HYDROCHLORIDE 1 MG: 1 INJECTION, SOLUTION INTRAMUSCULAR; INTRAVENOUS; SUBCUTANEOUS at 15:28

## 2017-01-30 RX ADMIN — MORPHINE SULFATE 1 MG: 2 INJECTION, SOLUTION INTRAMUSCULAR; INTRAVENOUS at 13:12

## 2017-01-30 RX ADMIN — Medication 5 MG: at 20:16

## 2017-01-30 RX ADMIN — RIVASTIGMINE TRANSDERMAL SYSTEM 1 PATCH: 4.6 PATCH, EXTENDED RELEASE TRANSDERMAL at 09:49

## 2017-01-30 RX ADMIN — VITAMIN D, TAB 1000IU (100/BT) 1000 UNITS: 25 TAB at 09:49

## 2017-01-30 RX ADMIN — PANTOPRAZOLE SODIUM 40 MG: 40 INJECTION, POWDER, FOR SOLUTION INTRAVENOUS at 06:38

## 2017-01-30 RX ADMIN — HYDROCODONE BITARTRATE AND ACETAMINOPHEN 1 TABLET: 7.5; 325 TABLET ORAL at 09:48

## 2017-01-30 RX ADMIN — POLYETHYLENE GLYCOL 3350 17 G: 17 POWDER, FOR SOLUTION ORAL at 09:48

## 2017-01-30 RX ADMIN — ENOXAPARIN SODIUM 30 MG: 30 INJECTION SUBCUTANEOUS at 09:48

## 2017-01-30 RX ADMIN — HYDROMORPHONE HYDROCHLORIDE 1 MG: 1 INJECTION, SOLUTION INTRAMUSCULAR; INTRAVENOUS; SUBCUTANEOUS at 04:01

## 2017-01-30 RX ADMIN — DEXTROSE AND SODIUM CHLORIDE 100 ML/HR: 5; 450 INJECTION, SOLUTION INTRAVENOUS at 09:50

## 2017-01-30 RX ADMIN — CALCIUM CARBONATE-VITAMIN D TAB 500 MG-200 UNIT 500 MG: 500-200 TAB at 09:49

## 2017-01-30 RX ADMIN — CETIRIZINE HYDROCHLORIDE 5 MG: 10 TABLET, FILM COATED ORAL at 09:49

## 2017-01-30 RX ADMIN — HYDROMORPHONE HYDROCHLORIDE 1 MG: 1 INJECTION, SOLUTION INTRAMUSCULAR; INTRAVENOUS; SUBCUTANEOUS at 00:38

## 2017-01-30 RX ADMIN — CEFAZOLIN SODIUM 1 G: 1 INJECTION, SOLUTION INTRAVENOUS at 00:33

## 2017-01-30 RX ADMIN — Medication 1 CAPSULE: at 09:49

## 2017-01-31 ENCOUNTER — APPOINTMENT (OUTPATIENT)
Dept: GENERAL RADIOLOGY | Facility: HOSPITAL | Age: 82
End: 2017-01-31
Attending: INTERNAL MEDICINE

## 2017-01-31 LAB
ANION GAP SERPL CALCULATED.3IONS-SCNC: 16.5 MMOL/L
BUN BLD-MCNC: 32 MG/DL (ref 8–23)
BUN/CREAT SERPL: 20.6 (ref 7–25)
CALCIUM SPEC-SCNC: 8.6 MG/DL (ref 8.2–9.6)
CHLORIDE SERPL-SCNC: 84 MMOL/L (ref 98–107)
CO2 SERPL-SCNC: 19.5 MMOL/L (ref 22–29)
CREAT BLD-MCNC: 1.55 MG/DL (ref 0.57–1)
GFR SERPL CREATININE-BSD FRML MDRD: 31 ML/MIN/1.73
GLUCOSE BLD-MCNC: 140 MG/DL (ref 65–99)
OSMOLALITY UR: 224 MOSM/KG (ref 250–1200)
POTASSIUM BLD-SCNC: 5.2 MMOL/L (ref 3.5–5.2)
SODIUM BLD-SCNC: 120 MMOL/L (ref 136–145)
SODIUM UR-SCNC: <20 MMOL/L

## 2017-01-31 PROCEDURE — 71101 X-RAY EXAM UNILAT RIBS/CHEST: CPT

## 2017-01-31 PROCEDURE — 83935 ASSAY OF URINE OSMOLALITY: CPT | Performed by: INTERNAL MEDICINE

## 2017-01-31 PROCEDURE — 25010000002 ENOXAPARIN PER 10 MG: Performed by: ORTHOPAEDIC SURGERY

## 2017-01-31 PROCEDURE — 25010000002 FUROSEMIDE PER 20 MG: Performed by: INTERNAL MEDICINE

## 2017-01-31 PROCEDURE — 25010000002 MORPHINE PER 10 MG: Performed by: INTERNAL MEDICINE

## 2017-01-31 PROCEDURE — 94799 UNLISTED PULMONARY SVC/PX: CPT

## 2017-01-31 PROCEDURE — 83930 ASSAY OF BLOOD OSMOLALITY: CPT | Performed by: INTERNAL MEDICINE

## 2017-01-31 PROCEDURE — 84300 ASSAY OF URINE SODIUM: CPT | Performed by: INTERNAL MEDICINE

## 2017-01-31 PROCEDURE — 25010000002 HYDROMORPHONE PER 4 MG: Performed by: INTERNAL MEDICINE

## 2017-01-31 PROCEDURE — 80048 BASIC METABOLIC PNL TOTAL CA: CPT | Performed by: INTERNAL MEDICINE

## 2017-01-31 RX ORDER — SODIUM CHLORIDE 9 MG/ML
75 INJECTION, SOLUTION INTRAVENOUS CONTINUOUS
Status: DISCONTINUED | OUTPATIENT
Start: 2017-01-31 | End: 2017-02-02

## 2017-01-31 RX ORDER — FERROUS SULFATE 325(65) MG
325 TABLET ORAL
Status: DISCONTINUED | OUTPATIENT
Start: 2017-01-31 | End: 2017-02-04 | Stop reason: HOSPADM

## 2017-01-31 RX ORDER — FUROSEMIDE 10 MG/ML
40 INJECTION INTRAMUSCULAR; INTRAVENOUS ONCE
Status: COMPLETED | OUTPATIENT
Start: 2017-01-31 | End: 2017-01-31

## 2017-01-31 RX ORDER — LORAZEPAM 0.5 MG/1
0.5 TABLET ORAL EVERY 6 HOURS PRN
Status: DISCONTINUED | OUTPATIENT
Start: 2017-01-31 | End: 2017-02-04 | Stop reason: HOSPADM

## 2017-01-31 RX ADMIN — HYDROMORPHONE HYDROCHLORIDE 1 MG: 1 INJECTION, SOLUTION INTRAMUSCULAR; INTRAVENOUS; SUBCUTANEOUS at 06:14

## 2017-01-31 RX ADMIN — DOCUSATE SODIUM 100 MG: 100 CAPSULE, LIQUID FILLED ORAL at 10:15

## 2017-01-31 RX ADMIN — CALCIUM CARBONATE-VITAMIN D TAB 500 MG-200 UNIT 500 MG: 500-200 TAB at 09:56

## 2017-01-31 RX ADMIN — Medication 1 CAPSULE: at 09:56

## 2017-01-31 RX ADMIN — RIVASTIGMINE TRANSDERMAL SYSTEM 1 PATCH: 4.6 PATCH, EXTENDED RELEASE TRANSDERMAL at 09:55

## 2017-01-31 RX ADMIN — VITAMIN D, TAB 1000IU (100/BT) 1000 UNITS: 25 TAB at 09:56

## 2017-01-31 RX ADMIN — ENOXAPARIN SODIUM 30 MG: 30 INJECTION SUBCUTANEOUS at 09:57

## 2017-01-31 RX ADMIN — POLYETHYLENE GLYCOL 3350 17 G: 17 POWDER, FOR SOLUTION ORAL at 09:57

## 2017-01-31 RX ADMIN — LORAZEPAM 0.5 MG: 0.5 TABLET ORAL at 10:12

## 2017-01-31 RX ADMIN — FUROSEMIDE 40 MG: 10 INJECTION, SOLUTION INTRAMUSCULAR; INTRAVENOUS at 22:27

## 2017-01-31 RX ADMIN — DEXTROSE AND SODIUM CHLORIDE 100 ML/HR: 5; 450 INJECTION, SOLUTION INTRAVENOUS at 05:54

## 2017-01-31 RX ADMIN — CETIRIZINE HYDROCHLORIDE 5 MG: 10 TABLET, FILM COATED ORAL at 09:56

## 2017-01-31 RX ADMIN — HYDROCODONE BITARTRATE AND ACETAMINOPHEN 1 TABLET: 7.5; 325 TABLET ORAL at 03:40

## 2017-01-31 RX ADMIN — HYDROMORPHONE HYDROCHLORIDE 1 MG: 1 INJECTION, SOLUTION INTRAMUSCULAR; INTRAVENOUS; SUBCUTANEOUS at 12:53

## 2017-01-31 RX ADMIN — DEXTROSE AND SODIUM CHLORIDE 125 ML/HR: 5; 450 INJECTION, SOLUTION INTRAVENOUS at 14:42

## 2017-01-31 RX ADMIN — SODIUM CHLORIDE 125 ML/HR: 9 INJECTION, SOLUTION INTRAVENOUS at 22:27

## 2017-01-31 RX ADMIN — Medication 5 MG: at 22:05

## 2017-01-31 RX ADMIN — IRON 325 MG: 65 TABLET ORAL at 12:45

## 2017-01-31 RX ADMIN — MORPHINE SULFATE 1 MG: 2 INJECTION, SOLUTION INTRAMUSCULAR; INTRAVENOUS at 04:33

## 2017-01-31 RX ADMIN — PANTOPRAZOLE SODIUM 40 MG: 40 INJECTION, POWDER, FOR SOLUTION INTRAVENOUS at 05:30

## 2017-02-01 LAB
ABO GROUP BLD: NORMAL
ANION GAP SERPL CALCULATED.3IONS-SCNC: 13.3 MMOL/L
BASOPHILS # BLD AUTO: 0 10*3/MM3 (ref 0–0.2)
BASOPHILS NFR BLD AUTO: 0 % (ref 0–1.5)
BLD GP AB SCN SERPL QL: NEGATIVE
BUN BLD-MCNC: 33 MG/DL (ref 8–23)
BUN/CREAT SERPL: 25.8 (ref 7–25)
CALCIUM SPEC-SCNC: 8.2 MG/DL (ref 8.2–9.6)
CHLORIDE SERPL-SCNC: 87 MMOL/L (ref 98–107)
CHLORIDE UR-SCNC: 26 MMOL/L
CHOLEST SERPL-MCNC: 114 MG/DL (ref 0–200)
CO2 SERPL-SCNC: 21.7 MMOL/L (ref 22–29)
CORTIS SERPL-MCNC: 16.66 MCG/DL
CREAT BLD-MCNC: 1.28 MG/DL (ref 0.57–1)
DEPRECATED RDW RBC AUTO: 42.3 FL (ref 37–54)
EOSINOPHIL # BLD AUTO: 0.03 10*3/MM3 (ref 0–0.7)
EOSINOPHIL NFR BLD AUTO: 0.3 % (ref 0.3–6.2)
ERYTHROCYTE [DISTWIDTH] IN BLOOD BY AUTOMATED COUNT: 12.5 % (ref 11.7–13)
GFR SERPL CREATININE-BSD FRML MDRD: 39 ML/MIN/1.73
GLUCOSE BLD-MCNC: 118 MG/DL (ref 65–99)
HCT VFR BLD AUTO: 22.6 % (ref 35.6–45.5)
HDLC SERPL-MCNC: 52 MG/DL (ref 40–60)
HGB BLD-MCNC: 7.4 G/DL (ref 11.9–15.5)
IMM GRANULOCYTES # BLD: 0.02 10*3/MM3 (ref 0–0.03)
IMM GRANULOCYTES NFR BLD: 0.2 % (ref 0–0.5)
LDLC SERPL CALC-MCNC: 48 MG/DL (ref 0–100)
LDLC/HDLC SERPL: 0.92 {RATIO}
LYMPHOCYTES # BLD AUTO: 3.89 10*3/MM3 (ref 0.9–4.8)
LYMPHOCYTES NFR BLD AUTO: 36 % (ref 19.6–45.3)
MCH RBC QN AUTO: 31.1 PG (ref 26.9–32)
MCHC RBC AUTO-ENTMCNC: 32.7 G/DL (ref 32.4–36.3)
MCV RBC AUTO: 95 FL (ref 80.5–98.2)
MONOCYTES # BLD AUTO: 0.67 10*3/MM3 (ref 0.2–1.2)
MONOCYTES NFR BLD AUTO: 6.2 % (ref 5–12)
NEUTROPHILS # BLD AUTO: 6.2 10*3/MM3 (ref 1.9–8.1)
NEUTROPHILS NFR BLD AUTO: 57.3 % (ref 42.7–76)
NT-PROBNP SERPL-MCNC: 9111 PG/ML (ref 0–1800)
OSMOLALITY SERPL: 256 MOSM/KG (ref 285–295)
OSMOLALITY UR: 160 MOSM/KG (ref 250–1200)
PLATELET # BLD AUTO: 164 10*3/MM3 (ref 140–500)
PMV BLD AUTO: 10.7 FL (ref 6–12)
POTASSIUM BLD-SCNC: 4.9 MMOL/L (ref 3.5–5.2)
RBC # BLD AUTO: 2.38 10*6/MM3 (ref 3.9–5.2)
RH BLD: POSITIVE
SODIUM BLD-SCNC: 122 MMOL/L (ref 136–145)
SODIUM UR-SCNC: 27 MMOL/L
TRIGL SERPL-MCNC: 72 MG/DL (ref 0–150)
TSH SERPL DL<=0.05 MIU/L-ACNC: 0.9 MIU/ML (ref 0.27–4.2)
VLDLC SERPL-MCNC: 14.4 MG/DL (ref 5–40)
WBC NRBC COR # BLD: 10.81 10*3/MM3 (ref 4.5–10.7)

## 2017-02-01 PROCEDURE — 86900 BLOOD TYPING SEROLOGIC ABO: CPT

## 2017-02-01 PROCEDURE — 82436 ASSAY OF URINE CHLORIDE: CPT | Performed by: INTERNAL MEDICINE

## 2017-02-01 PROCEDURE — 86901 BLOOD TYPING SEROLOGIC RH(D): CPT

## 2017-02-01 PROCEDURE — 97110 THERAPEUTIC EXERCISES: CPT

## 2017-02-01 PROCEDURE — 80061 LIPID PANEL: CPT | Performed by: INTERNAL MEDICINE

## 2017-02-01 PROCEDURE — 86923 COMPATIBILITY TEST ELECTRIC: CPT

## 2017-02-01 PROCEDURE — 84300 ASSAY OF URINE SODIUM: CPT | Performed by: INTERNAL MEDICINE

## 2017-02-01 PROCEDURE — 84443 ASSAY THYROID STIM HORMONE: CPT | Performed by: INTERNAL MEDICINE

## 2017-02-01 PROCEDURE — 83880 ASSAY OF NATRIURETIC PEPTIDE: CPT | Performed by: INTERNAL MEDICINE

## 2017-02-01 PROCEDURE — 86850 RBC ANTIBODY SCREEN: CPT

## 2017-02-01 PROCEDURE — 25010000002 ENOXAPARIN PER 10 MG: Performed by: ORTHOPAEDIC SURGERY

## 2017-02-01 PROCEDURE — 85025 COMPLETE CBC W/AUTO DIFF WBC: CPT | Performed by: INTERNAL MEDICINE

## 2017-02-01 PROCEDURE — 83935 ASSAY OF URINE OSMOLALITY: CPT | Performed by: INTERNAL MEDICINE

## 2017-02-01 PROCEDURE — 80048 BASIC METABOLIC PNL TOTAL CA: CPT | Performed by: INTERNAL MEDICINE

## 2017-02-01 PROCEDURE — 36430 TRANSFUSION BLD/BLD COMPNT: CPT

## 2017-02-01 PROCEDURE — 82533 TOTAL CORTISOL: CPT | Performed by: INTERNAL MEDICINE

## 2017-02-01 PROCEDURE — P9016 RBC LEUKOCYTES REDUCED: HCPCS

## 2017-02-01 RX ADMIN — POLYETHYLENE GLYCOL 3350 17 G: 17 POWDER, FOR SOLUTION ORAL at 10:09

## 2017-02-01 RX ADMIN — SODIUM CHLORIDE 125 ML/HR: 9 INJECTION, SOLUTION INTRAVENOUS at 06:16

## 2017-02-01 RX ADMIN — ENOXAPARIN SODIUM 30 MG: 30 INJECTION SUBCUTANEOUS at 10:10

## 2017-02-01 RX ADMIN — HYDROCODONE BITARTRATE AND ACETAMINOPHEN 1 TABLET: 7.5; 325 TABLET ORAL at 05:36

## 2017-02-01 RX ADMIN — VITAMIN D, TAB 1000IU (100/BT) 1000 UNITS: 25 TAB at 10:10

## 2017-02-01 RX ADMIN — HYDROCODONE BITARTRATE AND ACETAMINOPHEN 1 TABLET: 7.5; 325 TABLET ORAL at 22:08

## 2017-02-01 RX ADMIN — Medication 1 CAPSULE: at 10:10

## 2017-02-01 RX ADMIN — IRON 325 MG: 65 TABLET ORAL at 10:10

## 2017-02-01 RX ADMIN — METOPROLOL TARTRATE 50 MG: 50 TABLET ORAL at 10:10

## 2017-02-01 RX ADMIN — RIVASTIGMINE TRANSDERMAL SYSTEM 1 PATCH: 4.6 PATCH, EXTENDED RELEASE TRANSDERMAL at 10:12

## 2017-02-01 RX ADMIN — PANTOPRAZOLE SODIUM 40 MG: 40 INJECTION, POWDER, FOR SOLUTION INTRAVENOUS at 05:36

## 2017-02-01 RX ADMIN — CALCIUM CARBONATE-VITAMIN D TAB 500 MG-200 UNIT 500 MG: 500-200 TAB at 10:10

## 2017-02-01 RX ADMIN — LORAZEPAM 0.5 MG: 0.5 TABLET ORAL at 03:20

## 2017-02-02 ENCOUNTER — APPOINTMENT (OUTPATIENT)
Dept: GENERAL RADIOLOGY | Facility: HOSPITAL | Age: 82
End: 2017-02-02
Attending: INTERNAL MEDICINE

## 2017-02-02 LAB
ABO + RH BLD: NORMAL
ANION GAP SERPL CALCULATED.3IONS-SCNC: 13.8 MMOL/L
BASOPHILS # BLD AUTO: 0.01 10*3/MM3 (ref 0–0.2)
BASOPHILS NFR BLD AUTO: 0.1 % (ref 0–1.5)
BH BB BLOOD EXPIRATION DATE: NORMAL
BH BB BLOOD TYPE BARCODE: 7300
BH BB DISPENSE STATUS: NORMAL
BH BB PRODUCT CODE: NORMAL
BH BB UNIT NUMBER: NORMAL
BUN BLD-MCNC: 36 MG/DL (ref 8–23)
BUN/CREAT SERPL: 34.3 (ref 7–25)
CALCIUM SPEC-SCNC: 8.5 MG/DL (ref 8.2–9.6)
CHLORIDE SERPL-SCNC: 93 MMOL/L (ref 98–107)
CO2 SERPL-SCNC: 22.2 MMOL/L (ref 22–29)
CREAT BLD-MCNC: 1.05 MG/DL (ref 0.57–1)
DEPRECATED RDW RBC AUTO: 48.1 FL (ref 37–54)
EOSINOPHIL # BLD AUTO: 0.18 10*3/MM3 (ref 0–0.7)
EOSINOPHIL NFR BLD AUTO: 2.1 % (ref 0.3–6.2)
ERYTHROCYTE [DISTWIDTH] IN BLOOD BY AUTOMATED COUNT: 14.4 % (ref 11.7–13)
GFR SERPL CREATININE-BSD FRML MDRD: 49 ML/MIN/1.73
GLUCOSE BLD-MCNC: 107 MG/DL (ref 65–99)
HCT VFR BLD AUTO: 25.1 % (ref 35.6–45.5)
HGB BLD-MCNC: 8.6 G/DL (ref 11.9–15.5)
IMM GRANULOCYTES # BLD: 0.02 10*3/MM3 (ref 0–0.03)
IMM GRANULOCYTES NFR BLD: 0.2 % (ref 0–0.5)
LYMPHOCYTES # BLD AUTO: 4.18 10*3/MM3 (ref 0.9–4.8)
LYMPHOCYTES NFR BLD AUTO: 47.7 % (ref 19.6–45.3)
MCH RBC QN AUTO: 31.4 PG (ref 26.9–32)
MCHC RBC AUTO-ENTMCNC: 34.3 G/DL (ref 32.4–36.3)
MCV RBC AUTO: 91.6 FL (ref 80.5–98.2)
MONOCYTES # BLD AUTO: 0.54 10*3/MM3 (ref 0.2–1.2)
MONOCYTES NFR BLD AUTO: 6.2 % (ref 5–12)
NEUTROPHILS # BLD AUTO: 3.84 10*3/MM3 (ref 1.9–8.1)
NEUTROPHILS NFR BLD AUTO: 43.7 % (ref 42.7–76)
NT-PROBNP SERPL-MCNC: 6330 PG/ML (ref 0–1800)
PLATELET # BLD AUTO: 165 10*3/MM3 (ref 140–500)
PMV BLD AUTO: 9.9 FL (ref 6–12)
POTASSIUM BLD-SCNC: 4.8 MMOL/L (ref 3.5–5.2)
RBC # BLD AUTO: 2.74 10*6/MM3 (ref 3.9–5.2)
SODIUM BLD-SCNC: 129 MMOL/L (ref 136–145)
TROPONIN T SERPL-MCNC: 0.04 NG/ML (ref 0–0.03)
UNIT  ABO: NORMAL
UNIT  RH: NORMAL
WBC NRBC COR # BLD: 8.77 10*3/MM3 (ref 4.5–10.7)

## 2017-02-02 PROCEDURE — 84484 ASSAY OF TROPONIN QUANT: CPT | Performed by: INTERNAL MEDICINE

## 2017-02-02 PROCEDURE — 25010000002 FUROSEMIDE PER 20 MG: Performed by: INTERNAL MEDICINE

## 2017-02-02 PROCEDURE — 97110 THERAPEUTIC EXERCISES: CPT

## 2017-02-02 PROCEDURE — 80048 BASIC METABOLIC PNL TOTAL CA: CPT | Performed by: INTERNAL MEDICINE

## 2017-02-02 PROCEDURE — 85025 COMPLETE CBC W/AUTO DIFF WBC: CPT | Performed by: INTERNAL MEDICINE

## 2017-02-02 PROCEDURE — 93010 ELECTROCARDIOGRAM REPORT: CPT | Performed by: INTERNAL MEDICINE

## 2017-02-02 PROCEDURE — 83880 ASSAY OF NATRIURETIC PEPTIDE: CPT | Performed by: INTERNAL MEDICINE

## 2017-02-02 PROCEDURE — 94640 AIRWAY INHALATION TREATMENT: CPT

## 2017-02-02 PROCEDURE — 25010000002 ENOXAPARIN PER 10 MG: Performed by: ORTHOPAEDIC SURGERY

## 2017-02-02 PROCEDURE — 71010 HC CHEST PA OR AP: CPT

## 2017-02-02 PROCEDURE — 93005 ELECTROCARDIOGRAM TRACING: CPT | Performed by: INTERNAL MEDICINE

## 2017-02-02 PROCEDURE — 94799 UNLISTED PULMONARY SVC/PX: CPT

## 2017-02-02 RX ORDER — ALBUTEROL SULFATE 0.63 MG/3ML
0.63 SOLUTION RESPIRATORY (INHALATION) EVERY 6 HOURS PRN
Status: DISCONTINUED | OUTPATIENT
Start: 2017-02-02 | End: 2017-02-02 | Stop reason: CLARIF

## 2017-02-02 RX ORDER — FUROSEMIDE 10 MG/ML
40 INJECTION INTRAMUSCULAR; INTRAVENOUS ONCE
Status: COMPLETED | OUTPATIENT
Start: 2017-02-02 | End: 2017-02-02

## 2017-02-02 RX ORDER — ALBUTEROL SULFATE 2.5 MG/3ML
2.5 SOLUTION RESPIRATORY (INHALATION) EVERY 6 HOURS PRN
Status: DISCONTINUED | OUTPATIENT
Start: 2017-02-02 | End: 2017-02-04 | Stop reason: HOSPADM

## 2017-02-02 RX ADMIN — FUROSEMIDE 40 MG: 10 INJECTION, SOLUTION INTRAMUSCULAR; INTRAVENOUS at 20:50

## 2017-02-02 RX ADMIN — HYDROCODONE BITARTRATE AND ACETAMINOPHEN 1 TABLET: 7.5; 325 TABLET ORAL at 20:50

## 2017-02-02 RX ADMIN — IRON 325 MG: 65 TABLET ORAL at 09:48

## 2017-02-02 RX ADMIN — Medication 1 CAPSULE: at 09:48

## 2017-02-02 RX ADMIN — CALCIUM CARBONATE-VITAMIN D TAB 500 MG-200 UNIT 500 MG: 500-200 TAB at 17:55

## 2017-02-02 RX ADMIN — ENOXAPARIN SODIUM 30 MG: 30 INJECTION SUBCUTANEOUS at 09:47

## 2017-02-02 RX ADMIN — IRON 325 MG: 65 TABLET ORAL at 17:55

## 2017-02-02 RX ADMIN — PANTOPRAZOLE SODIUM 40 MG: 40 INJECTION, POWDER, FOR SOLUTION INTRAVENOUS at 05:47

## 2017-02-02 RX ADMIN — RIVASTIGMINE TRANSDERMAL SYSTEM 1 PATCH: 4.6 PATCH, EXTENDED RELEASE TRANSDERMAL at 09:47

## 2017-02-02 RX ADMIN — HYDROCODONE BITARTRATE AND ACETAMINOPHEN 1 TABLET: 7.5; 325 TABLET ORAL at 05:47

## 2017-02-02 RX ADMIN — VITAMIN D, TAB 1000IU (100/BT) 1000 UNITS: 25 TAB at 09:48

## 2017-02-02 RX ADMIN — IRON 325 MG: 65 TABLET ORAL at 12:29

## 2017-02-02 RX ADMIN — ALBUTEROL SULFATE 2.5 MG: 2.5 SOLUTION RESPIRATORY (INHALATION) at 21:42

## 2017-02-02 RX ADMIN — SODIUM CHLORIDE 125 ML/HR: 9 INJECTION, SOLUTION INTRAVENOUS at 08:04

## 2017-02-02 RX ADMIN — SODIUM CHLORIDE 75 ML/HR: 9 INJECTION, SOLUTION INTRAVENOUS at 17:03

## 2017-02-02 RX ADMIN — BISACODYL 10 MG: 5 TABLET, COATED ORAL at 17:54

## 2017-02-02 RX ADMIN — CALCIUM CARBONATE-VITAMIN D TAB 500 MG-200 UNIT 500 MG: 500-200 TAB at 09:48

## 2017-02-02 RX ADMIN — METOPROLOL TARTRATE 50 MG: 50 TABLET ORAL at 09:48

## 2017-02-02 RX ADMIN — HYDROCODONE BITARTRATE AND ACETAMINOPHEN 1 TABLET: 7.5; 325 TABLET ORAL at 02:03

## 2017-02-02 RX ADMIN — HYDROCODONE BITARTRATE AND ACETAMINOPHEN 1 TABLET: 7.5; 325 TABLET ORAL at 14:52

## 2017-02-03 LAB
ANION GAP SERPL CALCULATED.3IONS-SCNC: 13.7 MMOL/L
BASOPHILS # BLD AUTO: 0.02 10*3/MM3 (ref 0–0.2)
BASOPHILS NFR BLD AUTO: 0.2 % (ref 0–1.5)
BUN BLD-MCNC: 35 MG/DL (ref 8–23)
BUN/CREAT SERPL: 34.3 (ref 7–25)
CALCIUM SPEC-SCNC: 8.7 MG/DL (ref 8.2–9.6)
CHLORIDE SERPL-SCNC: 94 MMOL/L (ref 98–107)
CO2 SERPL-SCNC: 23.3 MMOL/L (ref 22–29)
CREAT BLD-MCNC: 1.02 MG/DL (ref 0.57–1)
DEPRECATED RDW RBC AUTO: 49.4 FL (ref 37–54)
EOSINOPHIL # BLD AUTO: 0.17 10*3/MM3 (ref 0–0.7)
EOSINOPHIL NFR BLD AUTO: 1.7 % (ref 0.3–6.2)
ERYTHROCYTE [DISTWIDTH] IN BLOOD BY AUTOMATED COUNT: 14.4 % (ref 11.7–13)
GFR SERPL CREATININE-BSD FRML MDRD: 51 ML/MIN/1.73
GLUCOSE BLD-MCNC: 118 MG/DL (ref 65–99)
HCT VFR BLD AUTO: 26.3 % (ref 35.6–45.5)
HGB BLD-MCNC: 8.5 G/DL (ref 11.9–15.5)
IMM GRANULOCYTES # BLD: 0.03 10*3/MM3 (ref 0–0.03)
IMM GRANULOCYTES NFR BLD: 0.3 % (ref 0–0.5)
LYMPHOCYTES # BLD AUTO: 4.23 10*3/MM3 (ref 0.9–4.8)
LYMPHOCYTES NFR BLD AUTO: 42.1 % (ref 19.6–45.3)
MCH RBC QN AUTO: 30.5 PG (ref 26.9–32)
MCHC RBC AUTO-ENTMCNC: 32.3 G/DL (ref 32.4–36.3)
MCV RBC AUTO: 94.3 FL (ref 80.5–98.2)
MONOCYTES # BLD AUTO: 0.61 10*3/MM3 (ref 0.2–1.2)
MONOCYTES NFR BLD AUTO: 6.1 % (ref 5–12)
NEUTROPHILS # BLD AUTO: 4.99 10*3/MM3 (ref 1.9–8.1)
NEUTROPHILS NFR BLD AUTO: 49.6 % (ref 42.7–76)
NRBC BLD MANUAL-RTO: 0 /100 WBC (ref 0–0)
PLATELET # BLD AUTO: 202 10*3/MM3 (ref 140–500)
PMV BLD AUTO: 10.1 FL (ref 6–12)
POTASSIUM BLD-SCNC: 4.4 MMOL/L (ref 3.5–5.2)
RBC # BLD AUTO: 2.79 10*6/MM3 (ref 3.9–5.2)
SODIUM BLD-SCNC: 131 MMOL/L (ref 136–145)
WBC NRBC COR # BLD: 10.05 10*3/MM3 (ref 4.5–10.7)

## 2017-02-03 PROCEDURE — 25010000002 HYDROMORPHONE PER 4 MG: Performed by: INTERNAL MEDICINE

## 2017-02-03 PROCEDURE — 97110 THERAPEUTIC EXERCISES: CPT

## 2017-02-03 PROCEDURE — 80048 BASIC METABOLIC PNL TOTAL CA: CPT | Performed by: INTERNAL MEDICINE

## 2017-02-03 PROCEDURE — 25010000002 ENOXAPARIN PER 10 MG: Performed by: ORTHOPAEDIC SURGERY

## 2017-02-03 PROCEDURE — 85025 COMPLETE CBC W/AUTO DIFF WBC: CPT | Performed by: INTERNAL MEDICINE

## 2017-02-03 RX ORDER — HALOPERIDOL 5 MG/ML
1 INJECTION INTRAMUSCULAR EVERY 6 HOURS PRN
Status: DISCONTINUED | OUTPATIENT
Start: 2017-02-03 | End: 2017-02-04 | Stop reason: HOSPADM

## 2017-02-03 RX ADMIN — PANTOPRAZOLE SODIUM 40 MG: 40 INJECTION, POWDER, FOR SOLUTION INTRAVENOUS at 10:02

## 2017-02-03 RX ADMIN — ENOXAPARIN SODIUM 30 MG: 30 INJECTION SUBCUTANEOUS at 10:02

## 2017-02-03 RX ADMIN — HYDROCODONE BITARTRATE AND ACETAMINOPHEN 1 TABLET: 7.5; 325 TABLET ORAL at 00:54

## 2017-02-03 RX ADMIN — METOPROLOL TARTRATE 50 MG: 50 TABLET ORAL at 09:56

## 2017-02-03 RX ADMIN — RIVASTIGMINE TRANSDERMAL SYSTEM 1 PATCH: 4.6 PATCH, EXTENDED RELEASE TRANSDERMAL at 09:56

## 2017-02-03 RX ADMIN — HYDROCODONE BITARTRATE AND ACETAMINOPHEN 1 TABLET: 7.5; 325 TABLET ORAL at 12:24

## 2017-02-03 RX ADMIN — HYDROCODONE BITARTRATE AND ACETAMINOPHEN 1 TABLET: 7.5; 325 TABLET ORAL at 18:49

## 2017-02-03 RX ADMIN — CALCIUM CARBONATE-VITAMIN D TAB 500 MG-200 UNIT 500 MG: 500-200 TAB at 18:49

## 2017-02-03 RX ADMIN — IRON 325 MG: 65 TABLET ORAL at 09:55

## 2017-02-03 RX ADMIN — ESTRADIOL 2 G: 0.1 CREAM VAGINAL at 09:57

## 2017-02-03 RX ADMIN — POLYETHYLENE GLYCOL 3350 17 G: 17 POWDER, FOR SOLUTION ORAL at 10:01

## 2017-02-03 RX ADMIN — CALCIUM CARBONATE-VITAMIN D TAB 500 MG-200 UNIT 500 MG: 500-200 TAB at 09:55

## 2017-02-03 RX ADMIN — HYDROCODONE BITARTRATE AND ACETAMINOPHEN 1 TABLET: 7.5; 325 TABLET ORAL at 07:05

## 2017-02-03 RX ADMIN — HYDROMORPHONE HYDROCHLORIDE 1 MG: 1 INJECTION, SOLUTION INTRAMUSCULAR; INTRAVENOUS; SUBCUTANEOUS at 14:10

## 2017-02-03 RX ADMIN — IRON 325 MG: 65 TABLET ORAL at 18:49

## 2017-02-03 RX ADMIN — LORAZEPAM 0.5 MG: 0.5 TABLET ORAL at 19:10

## 2017-02-03 RX ADMIN — Medication 1 CAPSULE: at 09:56

## 2017-02-03 RX ADMIN — IRON 325 MG: 65 TABLET ORAL at 11:48

## 2017-02-03 RX ADMIN — VITAMIN D, TAB 1000IU (100/BT) 1000 UNITS: 25 TAB at 09:56

## 2017-02-04 VITALS
DIASTOLIC BLOOD PRESSURE: 59 MMHG | HEIGHT: 63 IN | WEIGHT: 137.44 LBS | OXYGEN SATURATION: 96 % | BODY MASS INDEX: 24.35 KG/M2 | TEMPERATURE: 98.4 F | RESPIRATION RATE: 16 BRPM | SYSTOLIC BLOOD PRESSURE: 105 MMHG | HEART RATE: 99 BPM

## 2017-02-04 PROBLEM — S72.142A INTERTROCHANTERIC FRACTURE OF LEFT FEMUR (HCC): Status: RESOLVED | Noted: 2017-01-28 | Resolved: 2017-02-04

## 2017-02-04 LAB
ANION GAP SERPL CALCULATED.3IONS-SCNC: 10.7 MMOL/L
BASOPHILS # BLD AUTO: 0.02 10*3/MM3 (ref 0–0.2)
BASOPHILS NFR BLD AUTO: 0.2 % (ref 0–1.5)
BUN BLD-MCNC: 33 MG/DL (ref 8–23)
BUN/CREAT SERPL: 37.1 (ref 7–25)
CALCIUM SPEC-SCNC: 9.1 MG/DL (ref 8.2–9.6)
CHLORIDE SERPL-SCNC: 94 MMOL/L (ref 98–107)
CO2 SERPL-SCNC: 27.3 MMOL/L (ref 22–29)
CREAT BLD-MCNC: 0.89 MG/DL (ref 0.57–1)
DEPRECATED RDW RBC AUTO: 48.3 FL (ref 37–54)
EOSINOPHIL # BLD AUTO: 0.22 10*3/MM3 (ref 0–0.7)
EOSINOPHIL NFR BLD AUTO: 1.9 % (ref 0.3–6.2)
ERYTHROCYTE [DISTWIDTH] IN BLOOD BY AUTOMATED COUNT: 14.1 % (ref 11.7–13)
GFR SERPL CREATININE-BSD FRML MDRD: 59 ML/MIN/1.73
GLUCOSE BLD-MCNC: 112 MG/DL (ref 65–99)
HCT VFR BLD AUTO: 26.1 % (ref 35.6–45.5)
HGB BLD-MCNC: 8.6 G/DL (ref 11.9–15.5)
IMM GRANULOCYTES # BLD: 0.04 10*3/MM3 (ref 0–0.03)
IMM GRANULOCYTES NFR BLD: 0.4 % (ref 0–0.5)
LYMPHOCYTES # BLD AUTO: 5.21 10*3/MM3 (ref 0.9–4.8)
LYMPHOCYTES NFR BLD AUTO: 45.7 % (ref 19.6–45.3)
MCH RBC QN AUTO: 31 PG (ref 26.9–32)
MCHC RBC AUTO-ENTMCNC: 33 G/DL (ref 32.4–36.3)
MCV RBC AUTO: 94.2 FL (ref 80.5–98.2)
MONOCYTES # BLD AUTO: 0.65 10*3/MM3 (ref 0.2–1.2)
MONOCYTES NFR BLD AUTO: 5.7 % (ref 5–12)
NEUTROPHILS # BLD AUTO: 5.25 10*3/MM3 (ref 1.9–8.1)
NEUTROPHILS NFR BLD AUTO: 46.1 % (ref 42.7–76)
PLATELET # BLD AUTO: 232 10*3/MM3 (ref 140–500)
PMV BLD AUTO: 9.6 FL (ref 6–12)
POTASSIUM BLD-SCNC: 4.5 MMOL/L (ref 3.5–5.2)
RBC # BLD AUTO: 2.77 10*6/MM3 (ref 3.9–5.2)
SODIUM BLD-SCNC: 132 MMOL/L (ref 136–145)
WBC NRBC COR # BLD: 11.39 10*3/MM3 (ref 4.5–10.7)

## 2017-02-04 PROCEDURE — 97110 THERAPEUTIC EXERCISES: CPT

## 2017-02-04 PROCEDURE — 80048 BASIC METABOLIC PNL TOTAL CA: CPT | Performed by: INTERNAL MEDICINE

## 2017-02-04 PROCEDURE — 94799 UNLISTED PULMONARY SVC/PX: CPT

## 2017-02-04 PROCEDURE — 25010000002 ENOXAPARIN PER 10 MG: Performed by: ORTHOPAEDIC SURGERY

## 2017-02-04 PROCEDURE — 85025 COMPLETE CBC W/AUTO DIFF WBC: CPT | Performed by: INTERNAL MEDICINE

## 2017-02-04 RX ORDER — HYDROCODONE BITARTRATE AND ACETAMINOPHEN 7.5; 325 MG/1; MG/1
1 TABLET ORAL EVERY 6 HOURS PRN
Qty: 50 TABLET | Refills: 0 | Status: SHIPPED | OUTPATIENT
Start: 2017-02-04 | End: 2017-02-12

## 2017-02-04 RX ORDER — FERROUS SULFATE 325(65) MG
325 TABLET ORAL
Qty: 120 TABLET | Refills: 3 | Status: SHIPPED | OUTPATIENT
Start: 2017-02-04

## 2017-02-04 RX ORDER — ASPIRIN 81 MG/1
81 TABLET ORAL DAILY
Qty: 60 TABLET | Refills: 3 | Status: SHIPPED | OUTPATIENT
Start: 2017-02-04

## 2017-02-04 RX ORDER — LORAZEPAM 0.5 MG/1
0.5 TABLET ORAL EVERY 8 HOURS PRN
Qty: 50 TABLET | Refills: 0 | Status: SHIPPED | OUTPATIENT
Start: 2017-02-04 | End: 2017-02-10

## 2017-02-04 RX ORDER — PSEUDOEPHEDRINE HCL 30 MG
100 TABLET ORAL 2 TIMES DAILY PRN
Qty: 60 CAPSULE | Refills: 3 | Status: SHIPPED | OUTPATIENT
Start: 2017-02-04

## 2017-02-04 RX ORDER — BISACODYL 5 MG/1
10 TABLET, DELAYED RELEASE ORAL DAILY PRN
Qty: 40 TABLET | Refills: 0 | Status: SHIPPED | OUTPATIENT
Start: 2017-02-04

## 2017-02-04 RX ADMIN — CALCIUM CARBONATE-VITAMIN D TAB 500 MG-200 UNIT 500 MG: 500-200 TAB at 08:43

## 2017-02-04 RX ADMIN — VITAMIN D, TAB 1000IU (100/BT) 1000 UNITS: 25 TAB at 08:43

## 2017-02-04 RX ADMIN — IRON 325 MG: 65 TABLET ORAL at 13:36

## 2017-02-04 RX ADMIN — IRON 325 MG: 65 TABLET ORAL at 08:43

## 2017-02-04 RX ADMIN — HYDROCODONE BITARTRATE AND ACETAMINOPHEN 1 TABLET: 7.5; 325 TABLET ORAL at 13:36

## 2017-02-04 RX ADMIN — ENOXAPARIN SODIUM 30 MG: 30 INJECTION SUBCUTANEOUS at 08:42

## 2017-02-04 RX ADMIN — PANTOPRAZOLE SODIUM 40 MG: 40 INJECTION, POWDER, FOR SOLUTION INTRAVENOUS at 05:57

## 2017-02-04 RX ADMIN — HYDROCODONE BITARTRATE AND ACETAMINOPHEN 1 TABLET: 7.5; 325 TABLET ORAL at 00:49

## 2017-02-04 RX ADMIN — LORAZEPAM 0.5 MG: 0.5 TABLET ORAL at 13:07

## 2017-02-04 RX ADMIN — LORAZEPAM 0.5 MG: 0.5 TABLET ORAL at 07:21

## 2017-02-04 RX ADMIN — METOPROLOL TARTRATE 50 MG: 50 TABLET ORAL at 08:43

## 2017-02-04 RX ADMIN — RIVASTIGMINE TRANSDERMAL SYSTEM 1 PATCH: 4.6 PATCH, EXTENDED RELEASE TRANSDERMAL at 08:43

## 2017-02-04 RX ADMIN — LORAZEPAM 0.5 MG: 0.5 TABLET ORAL at 00:50

## 2017-02-04 RX ADMIN — HYDROCODONE BITARTRATE AND ACETAMINOPHEN 1 TABLET: 7.5; 325 TABLET ORAL at 05:57

## 2017-02-04 RX ADMIN — Medication 1 CAPSULE: at 08:43

## 2017-03-29 ENCOUNTER — TRANSCRIBE ORDERS (OUTPATIENT)
Dept: ADMINISTRATIVE | Facility: HOSPITAL | Age: 82
End: 2017-03-29

## 2017-03-29 DIAGNOSIS — I48.91 ATRIAL FIBRILLATION, UNSPECIFIED TYPE (HCC): Primary | ICD-10-CM

## 2017-03-29 DIAGNOSIS — M25.471 SWELLING OF BOTH ANKLES: ICD-10-CM

## 2017-03-29 DIAGNOSIS — M25.472 SWELLING OF BOTH ANKLES: ICD-10-CM

## 2017-04-03 ENCOUNTER — APPOINTMENT (OUTPATIENT)
Dept: CARDIOLOGY | Facility: HOSPITAL | Age: 82
End: 2017-04-03
Attending: INTERNAL MEDICINE

## 2021-03-02 DIAGNOSIS — Z23 IMMUNIZATION DUE: ICD-10-CM

## (undated) DEVICE — SPNG GZ WOVN 4X4IN 12PLY 10/BX STRL

## (undated) DEVICE — 1000 S-DRAPE TOWEL DRAPE 10/BX: Brand: STERI-DRAPE™

## (undated) DEVICE — GLV SURG TRIUMPH CLASSIC PF LTX 8 STRL

## (undated) DEVICE — GOWN,PREVENTION PLUS,XLONG/XLARGE,STRL: Brand: MEDLINE

## (undated) DEVICE — DRSNG SURESITE WNDW 4X4.5

## (undated) DEVICE — STPLR SKIN VISISTAT WD 35CT

## (undated) DEVICE — ISO/ALC 70PCT 4OZ

## (undated) DEVICE — PK HIP PINNING 40

## (undated) DEVICE — TOWEL,OR,DSP,ST,BLUE,STD,4/PK,20PK/CS: Brand: MEDLINE

## (undated) DEVICE — OCCLUSIVE GAUZE STRIP,3% BISMUTH TRIBROMOPHENATE IN PETROLATUM BLEND: Brand: XEROFORM

## (undated) DEVICE — SUT VIC 3/0 CTI 36IN J944H

## (undated) DEVICE — APPL CHLORAPREP W/TINT 26ML ORNG

## (undated) DEVICE — DRILL, AO SMALL: Brand: GAMMA

## (undated) DEVICE — K-WIRE
Type: IMPLANTABLE DEVICE | Status: NON-FUNCTIONAL
Removed: 2017-01-29

## (undated) DEVICE — SUT VIC 0 CT1 36IN J946H

## (undated) DEVICE — GLV SURG TRIUMPH ORTHO W/ALOE PF LTX 8 STRL

## (undated) DEVICE — SKIN PREP TRAY W/CHG: Brand: MEDLINE INDUSTRIES, INC.

## (undated) DEVICE — GLV SURG BIOGEL LTX PF 8

## (undated) DEVICE — MAT FLR ABSORBENT LG 4FT 10 2.5FT

## (undated) DEVICE — PAD,ABDOMINAL,8"X10",ST,LF: Brand: MEDLINE

## (undated) DEVICE — DRAPE,REIN 53X77,STERILE: Brand: MEDLINE